# Patient Record
Sex: FEMALE | Race: WHITE | NOT HISPANIC OR LATINO | URBAN - METROPOLITAN AREA
[De-identification: names, ages, dates, MRNs, and addresses within clinical notes are randomized per-mention and may not be internally consistent; named-entity substitution may affect disease eponyms.]

---

## 2017-09-25 ENCOUNTER — FOLLOW UP ESTABLISHED (OUTPATIENT)
Dept: URBAN - METROPOLITAN AREA CLINIC 64 | Facility: CLINIC | Age: 67
End: 2017-09-25
Payer: MEDICARE

## 2017-09-25 PROCEDURE — 92014 COMPRE OPH EXAM EST PT 1/>: CPT | Performed by: OPTOMETRIST

## 2017-09-25 PROCEDURE — 92134 CPTRZ OPH DX IMG PST SGM RTA: CPT | Performed by: OPTOMETRIST

## 2017-09-25 ASSESSMENT — KERATOMETRY
OD: 45.95
OS: 46.02

## 2017-09-25 ASSESSMENT — VISUAL ACUITY
OD: 20/50
OS: 20/100

## 2017-09-25 ASSESSMENT — INTRAOCULAR PRESSURE
OS: 8
OD: 9

## 2017-09-28 ENCOUNTER — FOLLOW UP ESTABLISHED (OUTPATIENT)
Dept: URBAN - METROPOLITAN AREA CLINIC 64 | Facility: CLINIC | Age: 67
End: 2017-09-28
Payer: MEDICARE

## 2017-09-28 DIAGNOSIS — Z79.84 LONG TERM (CURRENT) USE OF ORAL HYPOGLYCEMIC DRUGS: ICD-10-CM

## 2017-09-28 PROCEDURE — 92014 COMPRE OPH EXAM EST PT 1/>: CPT | Performed by: OPHTHALMOLOGY

## 2017-09-28 PROCEDURE — 92134 CPTRZ OPH DX IMG PST SGM RTA: CPT | Performed by: OPHTHALMOLOGY

## 2017-09-28 ASSESSMENT — INTRAOCULAR PRESSURE
OS: 15
OD: 16

## 2017-09-29 ENCOUNTER — Encounter (OUTPATIENT)
Dept: URBAN - METROPOLITAN AREA CLINIC 64 | Facility: CLINIC | Age: 67
End: 2017-09-29
Payer: MEDICARE

## 2017-09-29 DIAGNOSIS — Z01.818 ENCOUNTER FOR OTHER PREPROCEDURAL EXAMINATION: Primary | ICD-10-CM

## 2017-09-29 DIAGNOSIS — H35.343 MACULAR CYST, HOLE, OR PSEUDOHOLE, BILATERAL: ICD-10-CM

## 2017-09-29 PROCEDURE — 99213 OFFICE O/P EST LOW 20 MIN: CPT | Performed by: NURSE PRACTITIONER

## 2017-09-29 RX ORDER — CETIRIZINE HCL/PSEUDOEPHEDRINE 5 MG-120MG
10 MG TABLET, EXTENDED RELEASE 12 HR ORAL
Qty: 0 | Refills: 0 | Status: INACTIVE
Start: 2017-09-29 | End: 2018-02-16

## 2017-09-29 RX ORDER — LEVOTHYROXINE SODIUM 100 UG/1
TABLET ORAL
Qty: 0 | Refills: 0 | Status: INACTIVE
Start: 2017-09-29 | End: 2018-02-16

## 2017-09-29 RX ORDER — OFLOXACIN 3 MG/ML
0.3 % SOLUTION/ DROPS OPHTHALMIC
Qty: 1 | Refills: 0 | Status: INACTIVE
Start: 2017-09-29 | End: 2017-11-16

## 2017-09-29 RX ORDER — ASPIRIN 81 MG/1
81 MG TABLET, COATED ORAL
Qty: 0 | Refills: 0 | Status: INACTIVE
Start: 2017-09-29 | End: 2018-02-16

## 2017-09-29 RX ORDER — PREDNISOLONE ACETATE 10 MG/ML
1 % SUSPENSION/ DROPS OPHTHALMIC
Qty: 1 | Refills: 1 | Status: INACTIVE
Start: 2017-09-29 | End: 2018-02-16

## 2017-10-05 ENCOUNTER — SURGERY (OUTPATIENT)
Dept: URBAN - METROPOLITAN AREA SURGERY 42 | Facility: SURGERY | Age: 67
End: 2017-10-05
Payer: MEDICARE

## 2017-10-05 PROCEDURE — 67042 VIT FOR MACULAR HOLE: CPT | Performed by: OPHTHALMOLOGY

## 2017-10-06 ENCOUNTER — POST OP (OUTPATIENT)
Dept: URBAN - METROPOLITAN AREA CLINIC 64 | Facility: CLINIC | Age: 67
End: 2017-10-06

## 2017-10-06 PROCEDURE — 99024 POSTOP FOLLOW-UP VISIT: CPT | Performed by: OPTOMETRIST

## 2017-10-06 ASSESSMENT — INTRAOCULAR PRESSURE
OD: 13
OS: 19

## 2017-10-12 ENCOUNTER — POST OP (OUTPATIENT)
Dept: URBAN - METROPOLITAN AREA CLINIC 64 | Facility: CLINIC | Age: 67
End: 2017-10-12

## 2017-10-12 PROCEDURE — 99024 POSTOP FOLLOW-UP VISIT: CPT | Performed by: OPTOMETRIST

## 2017-10-12 ASSESSMENT — INTRAOCULAR PRESSURE
OS: 12
OD: 12

## 2017-10-25 ENCOUNTER — FOLLOW UP ESTABLISHED (OUTPATIENT)
Dept: URBAN - METROPOLITAN AREA CLINIC 64 | Facility: CLINIC | Age: 67
End: 2017-10-25
Payer: MEDICARE

## 2017-10-25 DIAGNOSIS — E11.9 TYPE 2 DIABETES MELLITUS WITHOUT COMPLICATIONS: ICD-10-CM

## 2017-10-25 DIAGNOSIS — H26.493 OTHER SECONDARY CATARACT, BILATERAL: ICD-10-CM

## 2017-10-25 PROCEDURE — 92134 CPTRZ OPH DX IMG PST SGM RTA: CPT | Performed by: OPHTHALMOLOGY

## 2017-10-25 PROCEDURE — 99024 POSTOP FOLLOW-UP VISIT: CPT | Performed by: OPHTHALMOLOGY

## 2017-10-25 ASSESSMENT — INTRAOCULAR PRESSURE
OD: 11
OS: 10

## 2017-11-02 ENCOUNTER — SURGERY (OUTPATIENT)
Dept: URBAN - METROPOLITAN AREA SURGERY 42 | Facility: SURGERY | Age: 67
End: 2017-11-02
Payer: MEDICARE

## 2017-11-02 PROCEDURE — 67042 VIT FOR MACULAR HOLE: CPT | Performed by: OPHTHALMOLOGY

## 2017-11-03 ENCOUNTER — POST OP (OUTPATIENT)
Dept: URBAN - METROPOLITAN AREA CLINIC 64 | Facility: CLINIC | Age: 67
End: 2017-11-03

## 2017-11-03 PROCEDURE — 99024 POSTOP FOLLOW-UP VISIT: CPT | Performed by: OPTOMETRIST

## 2017-11-03 ASSESSMENT — INTRAOCULAR PRESSURE
OD: 8
OS: 12

## 2017-11-16 ENCOUNTER — POST OP (OUTPATIENT)
Dept: URBAN - METROPOLITAN AREA CLINIC 64 | Facility: CLINIC | Age: 67
End: 2017-11-16

## 2017-11-16 DIAGNOSIS — Z96.1 PRESENCE OF INTRAOCULAR LENS: Primary | ICD-10-CM

## 2017-11-16 PROCEDURE — 99024 POSTOP FOLLOW-UP VISIT: CPT | Performed by: OPTOMETRIST

## 2017-11-16 ASSESSMENT — INTRAOCULAR PRESSURE
OD: 14
OS: 11

## 2017-11-16 ASSESSMENT — VISUAL ACUITY: OD: 20/40

## 2018-02-16 ENCOUNTER — FOLLOW UP ESTABLISHED (OUTPATIENT)
Dept: URBAN - METROPOLITAN AREA CLINIC 64 | Facility: CLINIC | Age: 68
End: 2018-02-16
Payer: MEDICARE

## 2018-02-16 PROCEDURE — 92012 INTRM OPH EXAM EST PATIENT: CPT | Performed by: OPTOMETRIST

## 2018-02-16 PROCEDURE — 92134 CPTRZ OPH DX IMG PST SGM RTA: CPT | Performed by: OPTOMETRIST

## 2018-02-16 RX ORDER — OMEPRAZOLE 20 MG/1
20 MG CAPSULE, DELAYED RELEASE ORAL
Qty: 0 | Refills: 0 | Status: INACTIVE
Start: 2018-02-16 | End: 2018-02-16

## 2018-02-16 ASSESSMENT — KERATOMETRY
OD: 45.92
OS: 46.03

## 2018-02-16 ASSESSMENT — VISUAL ACUITY
OD: 20/30
OS: 20/50

## 2018-02-16 ASSESSMENT — INTRAOCULAR PRESSURE
OS: 15
OD: 13

## 2018-12-14 ENCOUNTER — FOLLOW UP ESTABLISHED (OUTPATIENT)
Dept: URBAN - METROPOLITAN AREA CLINIC 64 | Facility: CLINIC | Age: 68
End: 2018-12-14
Payer: MEDICARE

## 2018-12-14 DIAGNOSIS — E11.3292 DIABETES MELLITUS TYPE 2 WITH MILD NON-PROLIFERATI: ICD-10-CM

## 2018-12-14 PROCEDURE — 92012 INTRM OPH EXAM EST PATIENT: CPT | Performed by: OPTOMETRIST

## 2018-12-14 PROCEDURE — 92134 CPTRZ OPH DX IMG PST SGM RTA: CPT | Performed by: OPTOMETRIST

## 2018-12-14 ASSESSMENT — INTRAOCULAR PRESSURE
OS: 11
OD: 10

## 2019-06-10 ENCOUNTER — FOLLOW UP ESTABLISHED (OUTPATIENT)
Dept: URBAN - METROPOLITAN AREA CLINIC 64 | Facility: CLINIC | Age: 69
End: 2019-06-10
Payer: MEDICARE

## 2019-06-10 PROCEDURE — 92012 INTRM OPH EXAM EST PATIENT: CPT | Performed by: OPTOMETRIST

## 2019-06-10 PROCEDURE — 92134 CPTRZ OPH DX IMG PST SGM RTA: CPT | Performed by: OPTOMETRIST

## 2019-06-10 ASSESSMENT — INTRAOCULAR PRESSURE
OS: 11
OD: 10

## 2019-12-05 ENCOUNTER — FOLLOW UP ESTABLISHED (OUTPATIENT)
Dept: URBAN - METROPOLITAN AREA CLINIC 64 | Facility: CLINIC | Age: 69
End: 2019-12-05
Payer: MEDICARE

## 2019-12-05 DIAGNOSIS — H52.4 PRESBYOPIA: ICD-10-CM

## 2019-12-05 PROCEDURE — 92014 COMPRE OPH EXAM EST PT 1/>: CPT | Performed by: OPTOMETRIST

## 2019-12-05 ASSESSMENT — KERATOMETRY
OD: 45.92
OS: 46.09

## 2019-12-05 ASSESSMENT — INTRAOCULAR PRESSURE
OD: 14
OS: 15

## 2019-12-05 ASSESSMENT — VISUAL ACUITY
OS: 20/60
OD: 20/40

## 2020-01-13 ENCOUNTER — RX CHECK (OUTPATIENT)
Dept: URBAN - METROPOLITAN AREA CLINIC 64 | Facility: CLINIC | Age: 70
End: 2020-01-13

## 2020-01-13 PROCEDURE — 92015 DETERMINE REFRACTIVE STATE: CPT | Performed by: OPTOMETRIST

## 2020-01-13 ASSESSMENT — KERATOMETRY
OS: 46.22
OD: 46.27

## 2021-05-14 ENCOUNTER — OFFICE VISIT (OUTPATIENT)
Dept: URBAN - METROPOLITAN AREA CLINIC 64 | Facility: CLINIC | Age: 71
End: 2021-05-14
Payer: MEDICARE

## 2021-05-14 PROCEDURE — 92014 COMPRE OPH EXAM EST PT 1/>: CPT | Performed by: OPTOMETRIST

## 2021-05-14 PROCEDURE — 92134 CPTRZ OPH DX IMG PST SGM RTA: CPT | Performed by: OPTOMETRIST

## 2021-05-14 ASSESSMENT — VISUAL ACUITY
OS: 20/50
OD: 20/25

## 2021-05-14 ASSESSMENT — KERATOMETRY
OS: 46.22
OD: 46.23

## 2021-05-14 ASSESSMENT — INTRAOCULAR PRESSURE
OD: 7
OS: 15

## 2021-05-14 NOTE — IMPRESSION/PLAN
Impression: Type 2 diabetes mellitus w/o complication: M47.2. Plan: Moderate non-proliferative diabetic retinopathy, no signs of neovascularization noted. Will refer the patient for retinal consult to determine if treatment is necessary. Discussed ocular and systemic benefits of maintaining blood sugar control. Last A1c ___.